# Patient Record
Sex: MALE | Race: WHITE | NOT HISPANIC OR LATINO | URBAN - METROPOLITAN AREA
[De-identification: names, ages, dates, MRNs, and addresses within clinical notes are randomized per-mention and may not be internally consistent; named-entity substitution may affect disease eponyms.]

---

## 2023-04-28 ENCOUNTER — EMERGENCY (EMERGENCY)
Facility: HOSPITAL | Age: 28
LOS: 1 days | Discharge: HOME | End: 2023-04-28
Admitting: EMERGENCY MEDICINE
Payer: COMMERCIAL

## 2023-04-28 VITALS
OXYGEN SATURATION: 99 % | HEART RATE: 67 BPM | RESPIRATION RATE: 18 BRPM | DIASTOLIC BLOOD PRESSURE: 58 MMHG | SYSTOLIC BLOOD PRESSURE: 128 MMHG | HEIGHT: 74 IN | TEMPERATURE: 98 F | WEIGHT: 220.02 LBS

## 2023-04-28 PROCEDURE — 99284 EMERGENCY DEPT VISIT MOD MDM: CPT

## 2023-04-28 RX ORDER — DORZOLAMIDE HYDROCHLORIDE TIMOLOL MALEATE 20; 5 MG/ML; MG/ML
1 SOLUTION/ DROPS OPHTHALMIC
Qty: 1 | Refills: 0
Start: 2023-04-28 | End: 2023-05-04

## 2023-04-28 RX ORDER — OFLOXACIN 0.3 %
1 DROPS OPHTHALMIC (EYE)
Qty: 1 | Refills: 0
Start: 2023-04-28 | End: 2023-05-04

## 2023-04-28 RX ORDER — PREDNISOLONE SODIUM PHOSPHATE 1 %
1 DROPS OPHTHALMIC (EYE)
Qty: 1 | Refills: 0
Start: 2023-04-28 | End: 2023-05-04

## 2023-04-28 RX ORDER — FLUORESCEIN SODIUM 9 MG
1 STRIP OPHTHALMIC (EYE) ONCE
Refills: 0 | Status: COMPLETED | OUTPATIENT
Start: 2023-04-28 | End: 2023-04-28

## 2023-04-28 RX ORDER — ATROPINE SULFATE 1 %
1 DROPS OPHTHALMIC (EYE)
Qty: 1 | Refills: 0
Start: 2023-04-28 | End: 2023-05-04

## 2023-04-28 RX ADMIN — Medication 1 APPLICATION(S): at 14:44

## 2023-04-28 RX ADMIN — Medication 1 DROP(S): at 14:45

## 2023-04-28 NOTE — ED ADULT NURSE NOTE - NSIMPLEMENTINTERV_GEN_ALL_ED
Implemented All Universal Safety Interventions:  [place] to call system. Call bell, personal items and telephone within reach. Instruct patient to call for assistance. Room bathroom lighting operational. Non-slip footwear when patient is off stretcher. Physically safe environment: no spills, clutter or unnecessary equipment. Stretcher in lowest position, wheels locked, appropriate side rails in place.

## 2023-04-28 NOTE — ED ADULT TRIAGE NOTE - AS TEMP SITE
Last visit: 11/15/21  Last refill: 6/29/21  Last labs: 5/19/21 and A1C 11/15/21    Refilled per standing orders     oral

## 2023-04-28 NOTE — ED PROVIDER NOTE - PATIENT PORTAL LINK FT
You can access the FollowMyHealth Patient Portal offered by [facility] by registering at the following website: [email or web address]. By joining [facility]’s FollowMyHealth portal, you will also be able to view your health information using other applications (apps) compatible with our system.

## 2023-04-28 NOTE — ED ADULT NURSE NOTE - OBJECTIVE STATEMENT
Patient presents to the ED complaining of right eye pain s/p confetti blowing in his right eye. Patient reports eye pain.

## 2023-04-28 NOTE — ED PROVIDER NOTE - PHYSICAL EXAMINATION
VITAL SIGNS: I have reviewed nursing notes and confirm.  CONSTITUTIONAL: Well-developed; in no acute distress.   SKIN:  warm and dry, no acute rash.   HEAD:  normocephalic, small abrasion to tip of nose  EYES: PERRL, EOM intact. Right eye: Pupil round and reactive to light. Hyphema present. 2mm corneal abrasion at 5 o'clock position. 20/30. Left eye. No trauma noted. 20/15.   ENT: No nasal discharge; airway clear.   NECK: Supple; non tender.  EXT: Normal ROM. No clubbing, cyanosis or edema. 2+ pulses to b/l ue/le.  NEURO: Alert, oriented, grossly unremarkable  PSYCH: Cooperative, mood and affect appropriate.

## 2023-04-28 NOTE — ED ADULT TRIAGE NOTE - CHIEF COMPLAINT QUOTE
Patient presents to ED c/o right eye pain, redness, and "foggy"/blurry vision s/p a confetti popper blowing up in his eye today.

## 2023-04-28 NOTE — ED PROVIDER NOTE - NS ED ROS FT
Constitutional: No fever. No chills.  Eyes: +eye pain. +photophobia. +redness. No discharge. No vision change.   ENT: No sore throat. No ear pain.  Cardiovascular: No chest pain. No leg swelling.  Respiratory: No cough. No shortness of breath.  GI: No abdominal pain. No vomiting. No diarrhea.   MSK: No joint pain. No back pain.   Skin: No rash. No abrasions.   Neuro: No numbness. No weakness.   Psych: No known mental health issues.

## 2023-04-28 NOTE — ED PROVIDER NOTE - OBJECTIVE STATEMENT
26yo male with pmhx of epilepsy on depakote presents with traumatic right eye injury. Pt is an employee and was participating in a work event when a confetti popper went off and struck him in the right eye. He reports immediate pain and photophobia. Felt a "film" over his vision. He denies other injury or trauma. He denies glasses or contact lens use. He denies prior eye surgeries.

## 2023-04-28 NOTE — ED PROVIDER NOTE - NSFOLLOWUPINSTRUCTIONS_ED_ALL_ED_FT
Apply cosopt in the affected eye twice daily.    Apply ocuflox in the affected eye twice daily.    Apply atropine in the affected eye twice daily.    Apply prednisolone in the affected eye four times daily.    Avoid strenuous activity and maintain head elevated position.    Please follow up at [facility] at 9AM on Monday morning.   [address]     Return to the Emergency Department if you develop severe headache, significant vision changes, vomiting, or any other concerns.

## 2023-04-28 NOTE — PROGRESS NOTE ADULT - SUBJECTIVE AND OBJECTIVE BOX
27y Male presented to ER after he accidentally had a popper go off near the eye. He bent down in a dark room and the popper went off near the right side of his face. He saw "white" when it happened but now notes vision is baseline. Reports right eye tearing, pain.     PAST MEDICAL & SURGICAL HISTORY:  Epilepsy        MEDICATIONS  (PRN):    Vital Signs Last 24 Hrs  T(C): 36.4 (28 Apr 2023 14:24), Max: 36.4 (28 Apr 2023 14:24)  T(F): 97.6 (28 Apr 2023 14:24), Max: 97.6 (28 Apr 2023 14:24)  HR: 67 (28 Apr 2023 14:24) (67 - 67)  BP: 128/58 (28 Apr 2023 14:24) (128/58 - 128/58)  BP(mean): --  RR: 18 (28 Apr 2023 14:24) (18 - 18)  SpO2: 99% (28 Apr 2023 14:24) (99% - 99%)    Parameters below as of 28 Apr 2023 14:24  Patient On (Oxygen Delivery Method): room air        POH: None  Gtts: None  Allergies: Keppra (Rash)  Topamax (Rash)      EXAM  nVA OD: 20/30 OS 20/20  P R/R no RAPD OU  EOM Full OU  CVF Full OU    20D PLE/SLE  LLA: OD developing periorbital ecchymosis, nose ecchymosis OS WNL  C/S: OD tr injection OS WNL  K:  OD; Sup epi defect 3x2 mm , OS Clear   A/C: OD: 1mm hyphema, 3+ RBCs OS D/Q  Iris: Flat, reactive OU  IOP: 19 / 12    DFE M1%/ N2.5% OD  Lens: clear   Vit: clear   D: sharp and pink   M: flat   V: normal course and caliber  P: intact 360  27y Male presented to ER after he accidentally had a popper go off near the eye. He bent down in a dark room and the popper went off near the right side of his face. He saw "white" when it happened but now notes vision is baseline. Reports right eye tearing, pain.     PAST MEDICAL & SURGICAL HISTORY:  Epilepsy        MEDICATIONS  (PRN):    Vital Signs Last 24 Hrs  T(C): 36.4 (28 Apr 2023 14:24), Max: 36.4 (28 Apr 2023 14:24)  T(F): 97.6 (28 Apr 2023 14:24), Max: 97.6 (28 Apr 2023 14:24)  HR: 67 (28 Apr 2023 14:24) (67 - 67)  BP: 128/58 (28 Apr 2023 14:24) (128/58 - 128/58)  BP(mean): --  RR: 18 (28 Apr 2023 14:24) (18 - 18)  SpO2: 99% (28 Apr 2023 14:24) (99% - 99%)    Parameters below as of 28 Apr 2023 14:24  Patient On (Oxygen Delivery Method): room air        POH: None  Gtts: None  Allergies: Keppra (Rash)  Topamax (Rash)      EXAM  nVA OD: 20/40+ OS 20/20  P R/R no RAPD OU  EOM Full OU  CVF Full OU    20D PLE/SLE  LLA: OD developing periorbital ecchymosis, nose ecchymosis OS WNL  C/S: OD tr injection OS WNL  K:  OD; Sup epi defect 3x2 mm , OS Clear   A/C: OD: 1mm hyphema, 3+ RBCs OS D/Q  Iris: Flat, reactive OU  IOP: 19 / 12    DFE M1%/ N2.5% OD  Lens: clear   Vit: clear   D: sharp and pink   M: flat, berlins edema  V: normal course and caliber, commotio along sup arcade  P: intact 360

## 2023-04-28 NOTE — ED PROVIDER NOTE - CLINICAL SUMMARY MEDICAL DECISION MAKING FREE TEXT BOX
Pt here after traumatic right eye injury. VA 20/30 in right eye, 20/15 left eye. He has hyphema with 2mm corneal abrasion at 5 o'clock position. Ophthalmology consulted and evaluated pt in the Emergency Department. Per ophthalmology, do not feel there is concern for globe rupture. Recommend regimen of eye drops including cosopt, ocuflox, atropine, and prednisolone with close follow up at [facility] on 5/1. Pt aware of plan. Rx sent to [facility]. Return precautions given.

## 2023-04-30 ENCOUNTER — EMERGENCY (EMERGENCY)
Facility: HOSPITAL | Age: 28
LOS: 1 days | Discharge: HOME | End: 2023-04-30
Admitting: STUDENT IN AN ORGANIZED HEALTH CARE EDUCATION/TRAINING PROGRAM
Payer: COMMERCIAL

## 2023-04-30 VITALS
SYSTOLIC BLOOD PRESSURE: 126 MMHG | OXYGEN SATURATION: 99 % | TEMPERATURE: 98 F | WEIGHT: 220.02 LBS | RESPIRATION RATE: 18 BRPM | HEART RATE: 68 BPM | HEIGHT: 74 IN | DIASTOLIC BLOOD PRESSURE: 70 MMHG

## 2023-04-30 PROBLEM — G40.909 EPILEPSY, UNSPECIFIED, NOT INTRACTABLE, WITHOUT STATUS EPILEPTICUS: Chronic | Status: ACTIVE | Noted: 2023-04-28

## 2023-04-30 PROCEDURE — 99283 EMERGENCY DEPT VISIT LOW MDM: CPT

## 2023-04-30 PROCEDURE — 99053 MED SERV 10PM-8AM 24 HR FAC: CPT

## 2023-04-30 PROCEDURE — 99282 EMERGENCY DEPT VISIT SF MDM: CPT

## 2023-04-30 NOTE — ED PROVIDER NOTE - CLINICAL SUMMARY MEDICAL DECISION MAKING FREE TEXT BOX
28 y/o m presents c/o blurry vision in right eye after using atropine drops he was given after a blunt ocular injury 2 days ago with known right side corneal abrasion and hyphema.  IOP wnl in ED, no further injuries, has f/u with ophthalmology tomorrow, will d/c, continue drops, f/u as scheduled.

## 2023-04-30 NOTE — ED ADULT NURSE NOTE - OBJECTIVE STATEMENT
Patient complaints of vision change, pain on Rt eye/around Rt eye. Patient discharged 2 days ago for same complaints with follow up appointment, prescriptions. States pain/vision change(limit) is not getting worse but came to ED s/p still having pain.  Photophobia noted, Pt is unable to open Rt eye appropriately. No drainage noted.  Denies HA, Dizziness, N/V/D or any other discomfort at this time.  Patient is alert and oriented, A&O4, steady gait noted.

## 2023-04-30 NOTE — ED PROVIDER NOTE - PATIENT PORTAL LINK FT
You can access the FollowMyHealth Patient Portal offered by [facility] by registering at the following website: [email or web address]. By joining [employer]’s FollowMyHealth portal, you will also be able to view your health information using other applications (apps) compatible with our system.

## 2023-04-30 NOTE — ED PROVIDER NOTE - OBJECTIVE STATEMENT
28 y/o m presents c/o decreased vision in right eye after using atropine drops.  Pt was seen in ED 2 days ago for right injury after a "popper" went off in his face, seen by ophthalmology and diagnosed with blunt ocular eye injury with corneal abrasion and hyphema on right side.  Pt stating his pain has improved, concerned he can't see well after using the atropine drops.

## 2023-04-30 NOTE — ED ADULT NURSE NOTE - CHIEF COMPLAINT QUOTE
woke up at 3 am with limited vision  and pain to  right eye ; was seen today at [facility] earlier but no Opthalmology available so decided  to be seen here was seen he  2 days ago with eye drops prescribed

## 2023-04-30 NOTE — ED PROVIDER NOTE - NSFOLLOWUPINSTRUCTIONS_ED_ALL_ED_FT
Hyphema  Hyphema is bleeding in the front part of the eye, between the clear covering of the eye (cornea) and the colored part of the eye (iris). You may be able to see the blood in the front part of your eye. A hyphema may be large or small.    Hyphema may be painful and can affect your vision. Treatment is important to prevent permanent loss of vision.    What are the causes?  Eye injury, such as a hard, direct hit to your eye or upper part of your face, is the most common cause of this condition. Eye surgery can also cause this condition. Other less common causes include:  Abnormal blood vessels that form in the iris.  Eye infections.  Blood clotting disorders.  Artificial lenses used after cataract surgery.  Eye cancer.  What increases the risk?  This condition is more likely to occur in people who play sports, especially sports that use small balls. You may also be more likely to develop this condition if you:  Have a disease that prevents normal blood clotting, such as hemophilia.  Take certain medicines that thin your blood, such as aspirin.  Have diabetes.  Had recent eye surgery.  Have sickle cell anemia.  What are the signs or symptoms?  Two eyes. One eye is normal, and the other eye has hyphema.  The most common sign of this condition is a pool of blood in the front of your eye. The blood may appear red or black. A very small hyphema may not be visible. A large hyphema may fill part or all of the front part of your eye. Symptoms may also include:  Blurred vision or vision loss.  Pain.  Sensitivity to bright light.  How is this diagnosed?  This condition is diagnosed based on:  Your medical history, including any recent eye or head trauma, or previous eye surgery.  A physical exam.  A blood test. This may be done to check for a bleeding disorder or sickle cell disease.  You may have an eye exam done by an eye specialist (ophthalmologist). This may include:  A vision test.  Checking your eye with a type of microscope (slit lamp). Your eye may be dilated.  Measuring the pressure in your eye.  How is this treated?  Treatment depends on the severity of the condition. Most hyphemas go away on their own over days to weeks. Your health care provider will monitor your hyphema closely until it goes away completely. Treatment may also include:  Restricted activity or bed rest with your head raised (elevated).  Wearing a cover over your eye (eye shield) to protect it from further injury.  Stopping all medicines that can increase bleeding, such as aspirin. Only do this as told by your health care provider.  Eye drops or medicines taken by mouth to control inflammation and pressure in your eye.  Eye surgery may be needed to remove the hyphema if other treatments do not help.    Follow these instructions at home:  A sign showing that a person should not lift anything heavy.  Rest in bed as told by your health care provider. Lie on your back and use extra pillows to keep your head elevated.  Take over-the-counter and prescription medicines only as told by your health care provider.  Wear your eye shield as told by your health care provider.  Do not bend forward or lower your head until your health care provider approves.  Do not lift anything that is heavier than 10 lb (4.5 kg), or the limit that you are told, until your health care provider says that it is safe.  Keep all follow-up visits. This is important.  How is this prevented?  Always wear eye protection when you are doing any activity that can result in eye injury.    Contact a health care provider if:  You develop pain in the affected eye.  Your vision is not improving.  The amount of blood in your eye does not decrease after several days.  Get help right away if:  Your vision gets worse.  The amount of blood in your eye increases.  You feel nauseated or you vomit.  Summary  Hyphema is bleeding in the front of the eye.  Hyphema may be painful and can affect your vision.  Eye injury, such as a hard, direct hit to your eye or upper part of your face, is the most common cause of this condition.  Treatment depends on the severity of the condition.  This information is not intended to replace advice given to you by your health care provider. Make sure you discuss any questions you have with your health care provider.

## 2023-04-30 NOTE — ED ADULT TRIAGE NOTE - CHIEF COMPLAINT QUOTE
woke up at 3 am with limited vision  and pain to  right eye ; was seen today at [facility] earlier but no Opthalmology available so decided  to be seen here was seen he  2 days ago with eye drops prescribed

## 2023-04-30 NOTE — ED ADULT NURSE NOTE - NSIMPLEMENTINTERV_GEN_ALL_ED
Implemented All Universal Safety Interventions:  [place] to call system. Call bell, personal items and telephone within reach. Instruct patient to call for assistance. Room bathroom lighting operational. Non-slip footwear when patient is off stretcher. Physically safe environment: no spills, clutter or unnecessary equipment. Stretcher in lowest position, wheels locked, appropriate side rails in place.
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
## 2023-05-01 DIAGNOSIS — S05.8X1A OTHER INJURIES OF RIGHT EYE AND ORBIT, INITIAL ENCOUNTER: ICD-10-CM

## 2023-05-01 DIAGNOSIS — G40.909 EPILEPSY, UNSPECIFIED, NOT INTRACTABLE, WITHOUT STATUS EPILEPTICUS: ICD-10-CM
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
## 2023-05-01 DIAGNOSIS — S05.01XA INJURY OF CONJUNCTIVA AND CORNEAL ABRASION WITHOUT FOREIGN BODY, RIGHT EYE, INITIAL ENCOUNTER: ICD-10-CM

## 2023-05-01 DIAGNOSIS — Z88.8 ALLERGY STATUS TO OTHER DRUGS, MEDICAMENTS AND BIOLOGICAL SUBSTANCES: ICD-10-CM

## 2023-05-01 DIAGNOSIS — S05.11XA CONTUSION OF EYEBALL AND ORBITAL TISSUES, RIGHT EYE, INITIAL ENCOUNTER: ICD-10-CM

## 2023-05-02 DIAGNOSIS — G40.909 EPILEPSY, UNSPECIFIED, NOT INTRACTABLE, WITHOUT STATUS EPILEPTICUS: ICD-10-CM

## 2023-05-02 DIAGNOSIS — S05.01XD INJURY OF CONJUNCTIVA AND CORNEAL ABRASION WITHOUT FOREIGN BODY, RIGHT EYE, SUBSEQUENT ENCOUNTER: ICD-10-CM

## 2023-05-02 DIAGNOSIS — H21.01 HYPHEMA, RIGHT EYE: ICD-10-CM

## 2023-05-02 DIAGNOSIS — Z88.8 ALLERGY STATUS TO OTHER DRUGS, MEDICAMENTS AND BIOLOGICAL SUBSTANCES: ICD-10-CM
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
## 2023-05-02 DIAGNOSIS — H53.8 OTHER VISUAL DISTURBANCES: ICD-10-CM
